# Patient Record
Sex: FEMALE | Race: WHITE | NOT HISPANIC OR LATINO | URBAN - METROPOLITAN AREA
[De-identification: names, ages, dates, MRNs, and addresses within clinical notes are randomized per-mention and may not be internally consistent; named-entity substitution may affect disease eponyms.]

---

## 2020-10-01 ENCOUNTER — IMPORTED ENCOUNTER (OUTPATIENT)
Dept: URBAN - METROPOLITAN AREA CLINIC 1 | Facility: CLINIC | Age: 60
End: 2020-10-01

## 2020-10-01 PROBLEM — H18.413: Noted: 2020-10-01

## 2020-10-01 PROBLEM — H04.123: Noted: 2020-10-01

## 2020-10-01 PROBLEM — H43.811: Noted: 2020-10-01

## 2020-10-01 PROBLEM — E11.9: Noted: 2020-10-01

## 2020-10-01 PROBLEM — Z79.84: Noted: 2020-10-01

## 2020-10-01 PROBLEM — H25.813: Noted: 2020-10-01

## 2020-10-01 PROCEDURE — 92004 COMPRE OPH EXAM NEW PT 1/>: CPT

## 2020-10-01 PROCEDURE — 92015 DETERMINE REFRACTIVE STATE: CPT

## 2020-10-01 NOTE — PATIENT DISCUSSION
1.  DM Type II (Oral Meds) without sign of diabetic retinopathy and no blot heme on dilated retinal examination today OU No Macular Edema -- Discussed the pathophysiology of diabetes and its effect on the eye and risk of blindness. Stressed the importance of strong glucose control. Advised of importance of at least yearly dilated examinations but to contact us immediately for any problems or concerns. 2. Cataract OU -- Observe for now without intervention. The patient was advised to contact us if any change or worsening of vision3. Dry Eyes OU -- Recommended the use of ATs BID OU routinely (Sample of Systane Complete given). 4.  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 RD Precautions. 5.  Arcus OUMRX for glasses given for glasses today. Letter to PCP. Return for an appointment in 1 year 27 with Dr. Caden Oneal.

## 2020-10-01 NOTE — PATIENT DISCUSSION
MRX for glasses given for glasses today. Letter to PCP. Return for an appointment in 1 year 27 with Dr. Bria Henderson.

## 2022-04-08 ASSESSMENT — VISUAL ACUITY
OD_CC: 20/20-1
OS_CC: 20/20-1
OS_SC: J10
OD_SC: J10

## 2022-04-08 ASSESSMENT — TONOMETRY
OS_IOP_MMHG: 17
OD_IOP_MMHG: 17

## 2022-04-14 ENCOUNTER — ESTABLISHED PATIENT (OUTPATIENT)
Dept: URBAN - METROPOLITAN AREA CLINIC 1 | Facility: CLINIC | Age: 62
End: 2022-04-14

## 2022-04-14 DIAGNOSIS — H18.413: ICD-10-CM

## 2022-04-14 DIAGNOSIS — H04.123: ICD-10-CM

## 2022-04-14 DIAGNOSIS — E11.9: ICD-10-CM

## 2022-04-14 DIAGNOSIS — H43.811: ICD-10-CM

## 2022-04-14 DIAGNOSIS — H25.813: ICD-10-CM

## 2022-04-14 PROCEDURE — 92015 DETERMINE REFRACTIVE STATE: CPT

## 2022-04-14 PROCEDURE — 99214 OFFICE O/P EST MOD 30 MIN: CPT

## 2022-04-14 ASSESSMENT — VISUAL ACUITY
OD_CC: J1
OD_BAT: 20/40
OS_BAT: 20/40
OS_CC: J2
OS_SC: 20/25-1
OD_SC: 20/20-1

## 2022-04-14 ASSESSMENT — TONOMETRY
OS_IOP_MMHG: 16
OD_IOP_MMHG: 16

## 2022-04-14 NOTE — PATIENT DISCUSSION
No holes, tears or detachments. Patient cautioned to call our office immediately if they experience a substantial change in their symptoms such as an increase in floaters, persistent flashes, loss of visual field (may appear as a shadow or a curtain) or decrease in visual acuity as these may indicate a retinal tear or detachment.

## 2022-04-14 NOTE — PATIENT DISCUSSION
Appears benign (OD). The patient has a lesion of the (RLL) eyelid which appears benign. Measurements were taken and observation at regular intervals was recommended. The patient was asked to report if there is any growth.

## 2022-04-14 NOTE — PATIENT DISCUSSION
Without sign of diabetic retinopathy and no blot heme on dilated retinal examination today OU No Macular Edema -- Discussed the pathophysiology of diabetes and its effect on the eye and risk of blindness. Stressed the importance of strong glucose control. Advised of importance of at least yearly dilated examinations but to contact us immediately for any problems or concerns.